# Patient Record
Sex: FEMALE | Race: WHITE
[De-identification: names, ages, dates, MRNs, and addresses within clinical notes are randomized per-mention and may not be internally consistent; named-entity substitution may affect disease eponyms.]

---

## 2017-12-27 ENCOUNTER — HOSPITAL ENCOUNTER (EMERGENCY)
Dept: HOSPITAL 35 - ER | Age: 62
Discharge: HOME | End: 2017-12-27
Payer: COMMERCIAL

## 2017-12-27 VITALS — SYSTOLIC BLOOD PRESSURE: 116 MMHG | DIASTOLIC BLOOD PRESSURE: 70 MMHG

## 2017-12-27 VITALS — HEIGHT: 65 IN | BODY MASS INDEX: 35.32 KG/M2 | WEIGHT: 212 LBS

## 2017-12-27 DIAGNOSIS — R11.2: Primary | ICD-10-CM

## 2017-12-27 DIAGNOSIS — Z88.8: ICD-10-CM

## 2017-12-27 DIAGNOSIS — R19.7: ICD-10-CM

## 2017-12-27 DIAGNOSIS — Z88.6: ICD-10-CM

## 2017-12-27 DIAGNOSIS — Z88.2: ICD-10-CM

## 2017-12-27 DIAGNOSIS — K21.9: ICD-10-CM

## 2017-12-27 DIAGNOSIS — Z90.89: ICD-10-CM

## 2017-12-27 DIAGNOSIS — Z88.1: ICD-10-CM

## 2017-12-27 DIAGNOSIS — J45.909: ICD-10-CM

## 2017-12-27 LAB
ALBUMIN SERPL-MCNC: 3.5 G/DL (ref 3.4–5)
ALP SERPL-CCNC: 109 U/L (ref 46–116)
ALT SERPL-CCNC: 22 U/L (ref 30–65)
ANION GAP SERPL CALC-SCNC: 10 MMOL/L (ref 7–16)
AST SERPL-CCNC: 24 U/L (ref 15–37)
BASOPHILS NFR BLD AUTO: 0 % (ref 0–2)
BILIRUB SERPL-MCNC: 0.5 MG/DL
BUN SERPL-MCNC: 23 MG/DL (ref 7–18)
CALCIUM SERPL-MCNC: 8.5 MG/DL (ref 8.5–10.1)
CHLORIDE SERPL-SCNC: 108 MMOL/L (ref 98–107)
CO2 SERPL-SCNC: 23 MMOL/L (ref 21–32)
CREAT SERPL-MCNC: 0.9 MG/DL (ref 0.6–1)
EOSINOPHIL NFR BLD: 0 % (ref 0–3)
ERYTHROCYTE [DISTWIDTH] IN BLOOD BY AUTOMATED COUNT: 13.2 % (ref 10.5–14.5)
GLUCOSE SERPL-MCNC: 127 MG/DL (ref 74–106)
GRANULOCYTES NFR BLD MANUAL: 97 % (ref 36–66)
HCT VFR BLD CALC: 43.8 % (ref 37–47)
HGB BLD-MCNC: 14.6 GM/DL (ref 12–15)
LYMPHOCYTES NFR BLD AUTO: 1 % (ref 24–44)
MANUAL DIFFERENTIAL PERFORMED BLD QL: YES
MCH RBC QN AUTO: 29.6 PG (ref 26–34)
MCHC RBC AUTO-ENTMCNC: 33.3 G/DL (ref 28–37)
MCV RBC: 89 FL (ref 80–100)
MONOCYTES NFR BLD: 1 % (ref 1–8)
NEUTROPHILS # BLD: 17.9 THOU/UL (ref 1.4–8.2)
NEUTS BAND NFR BLD: 1 % (ref 0–8)
PLATELET # BLD: 317 THOU/UL (ref 150–400)
POTASSIUM SERPL-SCNC: 4.6 MMOL/L (ref 3.5–5.1)
PROT SERPL-MCNC: 6.5 G/DL (ref 6.4–8.2)
RBC # BLD AUTO: 4.93 MIL/UL (ref 4.2–5)
RBC MORPH BLD: NORMAL
SODIUM SERPL-SCNC: 141 MMOL/L (ref 136–145)
TOTAL CELL COUNT: 100
WBC # BLD AUTO: 18.3 THOU/UL (ref 4–11)

## 2020-03-19 ENCOUNTER — HOSPITAL ENCOUNTER (OUTPATIENT)
Dept: HOSPITAL 35 - GI | Age: 65
Discharge: HOME | End: 2020-03-19
Attending: SPECIALIST
Payer: COMMERCIAL

## 2020-03-19 VITALS — HEIGHT: 65.98 IN | BODY MASS INDEX: 37.77 KG/M2 | WEIGHT: 235 LBS

## 2020-03-19 DIAGNOSIS — Z88.2: ICD-10-CM

## 2020-03-19 DIAGNOSIS — R19.5: Primary | ICD-10-CM

## 2020-03-19 DIAGNOSIS — Z79.899: ICD-10-CM

## 2020-03-19 DIAGNOSIS — Z98.890: ICD-10-CM

## 2020-03-19 DIAGNOSIS — M19.90: ICD-10-CM

## 2020-03-19 DIAGNOSIS — Z88.8: ICD-10-CM

## 2020-03-19 DIAGNOSIS — K57.30: ICD-10-CM

## 2020-03-19 DIAGNOSIS — J45.909: ICD-10-CM

## 2020-03-19 DIAGNOSIS — K21.9: ICD-10-CM

## 2020-03-19 DIAGNOSIS — E78.5: ICD-10-CM

## 2020-03-19 PROCEDURE — 62110: CPT

## 2020-03-19 PROCEDURE — 62900: CPT

## 2020-03-20 NOTE — P
[facility]
[place]   [address]                     PROCEDURE REPORT              
_______________________________________________________________________________
 
Name:       YASIR ARECHIGA            Room #:                     REG [facility]RISTOS 
[facility].#:      [id number]                       Account #:      [id number]  
Admission:  03/19/20    Attend Phys:    Devin Woods MD   
Discharge:              Date of Birth:  [birth year]  
                                                          Report #: [id number]
                                                                    [id number]   
_______________________________________________________________________________
THIS REPORT FOR:  
 
cc:  Heber Diaz MD, Steven E. MD Thesing, John A. MD                                           ~
CC: Devin Diaz MD
 
OUTPATIENT COLONOSCOPY REPORT
 
BRIEF HISTORY:  The patient is a 64-year-old woman with history of
Hemoccult-positive stools.  She had a similar situation a little more than 5
years ago and colonoscopy revealed diverticular disease and no neoplastic
lesions.  Upper endoscopy revealed gastritis.  She presents once again with
Hemoccult-positive stools.  She does not have visible blood in the stool.  She
denies GI symptoms.  She reports recent CBC was normal.  She also does use
nonsteroidals, which she recently stopped.  She also coadministrates omeprazole.
 
PREOPERATIVE DIAGNOSIS:  Hemoccult-positive stools.
 
POSTOPERATIVE DIAGNOSIS:  Pandiverticulosis coli, greater in left colon than the
right colon.
 
MEDICATIONS:  Deep sedation with propofol.
 
SPECIMEN:  None.
 
ESTIMATED BLOOD LOSS:  None.
 
PROCEDURE:  Colonoscopy to cecum and ileocecal valve.
 
FINDINGS:  Prior to propofol sedation, procedure of colonoscopy discussed with
the patient as well as potential risks and its complications.  She indicates she
understands and desires to proceed.
 
DESCRIPTION OF PROCEDURE:  With the patient in left lateral decubitus position,
digital examination was completed, which revealed no abnormalities. 
Subsequently, the Olympus video colonoscope was introduced into the rectum,
advanced under direct vision to the proximal colon.  The cecum was entered.  The
cecum was identified by the ileocecal valve and the appendiceal orifice. 
Multiple attempts were made to cross the ileocecal valve.  I could get the tip
of the scope into the mouth of the ileocecal valve, but could not deeply
intubate the ileocecal valve.  At that level, no mucosal abnormalities were
seen.  After multiple attempts failed, the scope was withdrawn throughout the
colon and examination was completed.  Upon slow withdrawal of the scope, the
 
 
 
[facility]
[address]
[place]   [address]                     PROCEDURE REPORT              
_______________________________________________________________________________
 
Name:       YASIR ARECHIGA JUSTINO            Room #:                     REG [facility].#:      [id number]                       Account #:      [id number]  
Admission:  03/19/20    Attend Phys:    Devin Woods MD   
Discharge:              Date of Birth:  [birth year]  
                                                          Report #: [id number]
                                                                    [id number]   
_______________________________________________________________________________
prep was good.  The mucosa was within normal limits, normal vascular pattern,
normal light reflex.  No neoplastic lesions were seen anywhere during this
examination.  The mucosa was normal throughout the entire colon.  No blood was
seen.  In addition, potential bleeding lesions such as inflammatory changes or
vascular ectasias were not seen.  However, she did have diverticular disease
throughout most of the colon including the proximal colon.  There were scattered
diverticula proximally and moderately severe diverticular disease primarily in
the sigmoid colon.  There was no endoscopic evidence of diverticulitis.  The
scope was withdrawn in the rectum, no abnormalities were seen.  Upon
retroflexion, no abnormalities were seen.  In particular, hemorrhoids were not
seen.  Scope was withdrawn.  The patient tolerated the procedure well.
 
CONDITION OF THE PATIENT UPON DISCHARGE:  Following procedure, the patient
drowsy, aroused, conversant.  She will be discharged home when fully ambulatory.
 
INSTRUCTIONS TO THE PATIENT AND FAMILY AT THE TIME OF DISCHARGE:  No neoplastic
lesions were seen.  No bleeding lesions were seen.  The source of her Hemoccult
positive stools is not clearly identified on today's exam.  She does not have
any GI symptoms.  She had been using nonsteroidals, which could be a factor for
Hemoccult-positive stools.  She does coadministrate with omeprazole.  If there
remains concern, we consider another upper endoscopy, but 5 years ago in spite
of use of nonsteroidals, no significant mucosal disease was seen.  She will
follow up with Dr. Heber Diaz.  It is very possible that the nonsteroidals are
causing the blood in the stool.  If there remains additional concern, an upper
endoscopy could be completed.  If there is evidence of anemia, a small bowel
capsule study would be a consideration as well.
 
As far as colorectal screening, she should return in 10 years for average risk
screening colonoscopy.
 
Withdrawal time from cecum was 9 minutes 49 seconds.
 
 
 
 
 
 
 
 
 
 
 
 
 
  <ELECTRONICALLY SIGNED>
   By: Devin Woods MD           
  03/20/20     1709
D: 03/19/20 0931                           _____________________________________
T: 03/19/20 1005                           Devin Woods MD             /nt